# Patient Record
Sex: MALE | Race: BLACK OR AFRICAN AMERICAN | Employment: UNEMPLOYED | URBAN - NONMETROPOLITAN AREA
[De-identification: names, ages, dates, MRNs, and addresses within clinical notes are randomized per-mention and may not be internally consistent; named-entity substitution may affect disease eponyms.]

---

## 2018-03-20 ENCOUNTER — HOSPITAL ENCOUNTER (OUTPATIENT)
Dept: PSYCHIATRY | Age: 48
Setting detail: THERAPIES SERIES
Discharge: HOME | End: 2018-03-20
Payer: MEDICAID

## 2018-03-20 PROCEDURE — 90791 PSYCH DIAGNOSTIC EVALUATION: CPT | Performed by: PHYSICIAN ASSISTANT

## 2018-03-30 ENCOUNTER — HOSPITAL ENCOUNTER (OUTPATIENT)
Dept: PSYCHIATRY | Age: 48
Setting detail: THERAPIES SERIES
End: 2018-03-30
Payer: MEDICAID

## 2018-07-19 ENCOUNTER — APPOINTMENT (OUTPATIENT)
Dept: GENERAL RADIOLOGY | Age: 48
DRG: 056 | End: 2018-07-19
Payer: MEDICAID

## 2018-07-19 ENCOUNTER — HOSPITAL ENCOUNTER (INPATIENT)
Age: 48
LOS: 6 days | Discharge: HOME | DRG: 056 | End: 2018-07-25
Attending: EMERGENCY MEDICINE | Admitting: SURGERY
Payer: MEDICAID

## 2018-07-19 ENCOUNTER — APPOINTMENT (OUTPATIENT)
Dept: CT IMAGING | Age: 48
DRG: 056 | End: 2018-07-19
Payer: MEDICAID

## 2018-07-19 ENCOUNTER — APPOINTMENT (OUTPATIENT)
Dept: MRI IMAGING | Age: 48
DRG: 056 | End: 2018-07-19
Payer: MEDICAID

## 2018-07-19 DIAGNOSIS — R56.9 SEIZURE (HCC): ICD-10-CM

## 2018-07-19 DIAGNOSIS — S02.0XXB OPEN FRACTURE OF PARIETAL BONE, INITIAL ENCOUNTER (HCC): Primary | ICD-10-CM

## 2018-07-19 DIAGNOSIS — S01.01XA LACERATION OF SCALP, INITIAL ENCOUNTER: ICD-10-CM

## 2018-07-19 DIAGNOSIS — E87.20 LACTIC ACID ACIDOSIS: ICD-10-CM

## 2018-07-19 DIAGNOSIS — S02.0XXD: ICD-10-CM

## 2018-07-19 PROBLEM — N17.9 AKI (ACUTE KIDNEY INJURY) (HCC): Status: ACTIVE | Noted: 2018-07-19

## 2018-07-19 PROBLEM — R56.1 SEIZURE AFTER HEAD INJURY (HCC): Status: ACTIVE | Noted: 2018-07-19
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
## 2018-07-19 PROBLEM — F19.90 ILLICIT DRUG USE: Status: ACTIVE | Noted: 2018-07-19

## 2018-07-19 PROBLEM — J96.90 RESPIRATORY FAILURE AFTER TRAUMA (HCC): Status: ACTIVE | Noted: 2018-07-19

## 2018-07-19 PROBLEM — S02.91XB OPEN DEPRESSED FRACTURE OF SKULL (HCC): Status: ACTIVE | Noted: 2018-07-19
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
## 2018-07-19 LAB
ABO: NORMAL
ACETAMINOPHEN LEVEL: < 5 UG/ML (ref 0–20)
ALBUMIN SERPL-MCNC: 4.3 G/DL (ref 3.5–5.1)
ALLEN TEST: ABNORMAL
ALP BLD-CCNC: 90 U/L (ref 38–126)
ALT SERPL-CCNC: 30 U/L (ref 11–66)
AMORPHOUS: ABNORMAL
AMPHETAMINE+METHAMPHETAMINE URINE SCREEN: NEGATIVE
ANGLE TEG: 69.5 DEG (ref 53–72)
ANION GAP SERPL CALCULATED.3IONS-SCNC: 16 MEQ/L (ref 8–16)
ANION GAP SERPL CALCULATED.3IONS-SCNC: 33 MEQ/L (ref 8–16)
ANTIBODY SCREEN: NORMAL
APTT: 26.3 SECONDS (ref 22–38)
AST SERPL-CCNC: 43 U/L (ref 5–40)
BACTERIA: ABNORMAL
BARBITURATE QUANTITATIVE URINE: NEGATIVE
BASE EXCESS (CALCULATED): -7.2 MMOL/L (ref -2.5–2.5)
BASOPHILS # BLD: 0.3 %
BASOPHILS ABSOLUTE: 0 THOU/MM3 (ref 0–0.1)
BENZODIAZEPINE QUANTITATIVE URINE: NEGATIVE
BILIRUB SERPL-MCNC: 0.3 MG/DL (ref 0.3–1.2)
BILIRUBIN URINE: NEGATIVE
BLOOD, URINE: ABNORMAL
BUN BLDV-MCNC: 12 MG/DL (ref 7–22)
BUN BLDV-MCNC: 16 MG/DL (ref 7–22)
CALCIUM SERPL-MCNC: 8.9 MG/DL (ref 8.5–10.5)
CALCIUM SERPL-MCNC: 9.2 MG/DL (ref 8.5–10.5)
CANNABINOID QUANTITATIVE URINE: POSITIVE
CASTS: ABNORMAL /LPF
CASTS: ABNORMAL /LPF
CHARACTER, URINE: ABNORMAL
CHLORIDE BLD-SCNC: 100 MEQ/L (ref 98–111)
CHLORIDE BLD-SCNC: 104 MEQ/L (ref 98–111)
CO2: 21 MEQ/L (ref 23–33)
CO2: 9 MEQ/L (ref 23–33)
COCAINE METABOLITE QUANTITATIVE URINE: POSITIVE
COLLECTED BY:: ABNORMAL
COLOR: YELLOW
COMMENT: ABNORMAL
CREAT SERPL-MCNC: 1 MG/DL (ref 0.4–1.2)
CREAT SERPL-MCNC: 1.3 MG/DL (ref 0.4–1.2)
CRYSTALS: ABNORMAL
DEVICE: ABNORMAL
EKG ATRIAL RATE: 101 BPM
EKG P AXIS: 81 DEGREES
EKG P-R INTERVAL: 150 MS
EKG Q-T INTERVAL: 370 MS
EKG QRS DURATION: 84 MS
EKG QTC CALCULATION (BAZETT): 479 MS
EKG R AXIS: 60 DEGREES
EKG T AXIS: 74 DEGREES
EKG VENTRICULAR RATE: 101 BPM
EOSINOPHIL # BLD: 1.4 %
EOSINOPHILS ABSOLUTE: 0.1 THOU/MM3 (ref 0–0.4)
EPITHELIAL CELLS, UA: ABNORMAL /HPF
EPL-TEG: 1 %
ERYTHROCYTE [DISTWIDTH] IN BLOOD BY AUTOMATED COUNT: 13.5 % (ref 11.5–14.5)
ERYTHROCYTE [DISTWIDTH] IN BLOOD BY AUTOMATED COUNT: 13.6 % (ref 11.5–14.5)
ERYTHROCYTE [DISTWIDTH] IN BLOOD BY AUTOMATED COUNT: 42 FL (ref 35–45)
ERYTHROCYTE [DISTWIDTH] IN BLOOD BY AUTOMATED COUNT: 45.9 FL (ref 35–45)
ETHYL ALCOHOL, SERUM: < 0.01 %
GFR SERPL CREATININE-BSD FRML MDRD: 71 ML/MIN/1.73M2
GFR SERPL CREATININE-BSD FRML MDRD: > 90 ML/MIN/1.73M2
GLUCOSE BLD-MCNC: 111 MG/DL (ref 70–108)
GLUCOSE BLD-MCNC: 93 MG/DL (ref 70–108)
GLUCOSE, URINE: NEGATIVE MG/DL
HCO3: 18 MMOL/L (ref 23–28)
HCT VFR BLD CALC: 42.1 % (ref 42–52)
HCT VFR BLD CALC: 43 % (ref 42–52)
HEMOGLOBIN: 12.9 GM/DL (ref 14–18)
HEMOGLOBIN: 14.3 GM/DL (ref 14–18)
HEPARIN THERAPY: NO
IFIO2: 50
IMMATURE GRANS (ABS): 0.02 THOU/MM3 (ref 0–0.07)
IMMATURE GRANULOCYTES: 0.3 %
INHIBITION AA TEG: 41 %
INHIBITION ADP TEG: 11.4 %
INR BLD: 0.93 (ref 0.85–1.13)
KETONES, URINE: NEGATIVE
KINETICS TEG: 1.4 MINUTES (ref 1–3)
LACTIC ACID: 18.8 MMOL/L (ref 0.5–2.2)
LACTIC ACID: 3.9 MMOL/L (ref 0.5–2.2)
LEUKOCYTE EST, POC: NEGATIVE
LY30 (LYSIS) TEG: 1 % (ref 0–7.5)
LYMPHOCYTES # BLD: 33.2 %
LYMPHOCYTES ABSOLUTE: 2.2 THOU/MM3 (ref 1–4.8)
MA (MAX CLOT) TEG: 63.4 MM (ref 50–70)
MA(AA) TEG: 39.7 MM
MA(ACTIVATED) TEG: 5.6 MM
MA(ADP) TEG: 56.8 MM
MAGNESIUM: 2.3 MG/DL (ref 1.6–2.4)
MCH RBC QN AUTO: 28.3 PG (ref 26–33)
MCH RBC QN AUTO: 28.4 PG (ref 26–33)
MCHC RBC AUTO-ENTMCNC: 30.6 GM/DL (ref 32.2–35.5)
MCHC RBC AUTO-ENTMCNC: 33.3 GM/DL (ref 32.2–35.5)
MCV RBC AUTO: 85.3 FL (ref 80–94)
MCV RBC AUTO: 92.3 FL (ref 80–94)
MISCELLANEOUS LAB TEST RESULT: ABNORMAL
MISCELLANEOUS LAB TEST RESULT: ABNORMAL
MODE: ABNORMAL
MONOCYTES # BLD: 8.8 %
MONOCYTES ABSOLUTE: 0.6 THOU/MM3 (ref 0.4–1.3)
MRSA SCREEN RT-PCR: NEGATIVE
NITRITE, URINE: NEGATIVE
NUCLEATED RED BLOOD CELLS: 0 /100 WBC
O2 SATURATION: 96 %
OPIATES, URINE: NEGATIVE
OSMOLALITY CALCULATION: 285 MOSMOL/KG (ref 275–300)
OSMOLALITY URINE: 354 MOSMOL/KG (ref 250–750)
OSMOLALITY URINE: 604 MOSMOL/KG (ref 250–750)
OSMOLALITY: 298 MOSMOL/KG (ref 275–295)
OXYCODONE: NEGATIVE
PCO2: 33 MMHG (ref 35–45)
PH BLOOD GAS: 7.34 (ref 7.35–7.45)
PH UA: 5
PHENCYCLIDINE QUANTITATIVE URINE: NEGATIVE
PLATELET # BLD: 174 THOU/MM3 (ref 130–400)
PLATELET # BLD: 178 THOU/MM3 (ref 130–400)
PMV BLD AUTO: 11 FL (ref 9.4–12.4)
PMV BLD AUTO: 11 FL (ref 9.4–12.4)
PO2: 84 MMHG (ref 71–104)
POTASSIUM REFLEX MAGNESIUM: 3.4 MEQ/L (ref 3.5–5.2)
POTASSIUM SERPL-SCNC: 3.9 MEQ/L (ref 3.5–5.2)
PROTEIN UA: 100 MG/DL
RBC # BLD: 4.56 MILL/MM3 (ref 4.7–6.1)
RBC # BLD: 5.04 MILL/MM3 (ref 4.7–6.1)
RBC URINE: ABNORMAL /HPF
REACTION TIME TEG: 4.8 MINUTES (ref 5–10)
RENAL EPITHELIAL, UA: ABNORMAL
RH FACTOR: NORMAL
SALICYLATE, SERUM: < 0.3 MG/DL (ref 2–10)
SEG NEUTROPHILS: 56 %
SEGMENTED NEUTROPHILS ABSOLUTE COUNT: 3.6 THOU/MM3 (ref 1.8–7.7)
SET PEEP: 6 MMHG
SET RESPIRATORY RATE: 14 BPM
SODIUM BLD-SCNC: 141 MEQ/L (ref 135–145)
SODIUM BLD-SCNC: 142 MEQ/L (ref 135–145)
SOURCE, BLOOD GAS: ABNORMAL
SPECIFIC GRAVITY UA: 1.02 (ref 1–1.03)
TOTAL PROTEIN: 7 G/DL (ref 6.1–8)
UROBILINOGEN, URINE: 0.2 EU/DL
WBC # BLD: 6.5 THOU/MM3 (ref 4.8–10.8)
WBC # BLD: 8.8 THOU/MM3 (ref 4.8–10.8)
WBC UA: ABNORMAL /HPF
YEAST: ABNORMAL

## 2018-07-19 PROCEDURE — 74177 CT ABD & PELVIS W/CONTRAST: CPT

## 2018-07-19 PROCEDURE — 2580000003 HC RX 258: Performed by: INTERNAL MEDICINE

## 2018-07-19 PROCEDURE — 6360000002 HC RX W HCPCS

## 2018-07-19 PROCEDURE — 6360000002 HC RX W HCPCS: Performed by: NURSE PRACTITIONER

## 2018-07-19 PROCEDURE — 93005 ELECTROCARDIOGRAM TRACING: CPT | Performed by: EMERGENCY MEDICINE

## 2018-07-19 PROCEDURE — 0BH17EZ INSERTION OF ENDOTRACHEAL AIRWAY INTO TRACHEA, VIA NATURAL OR ARTIFICIAL OPENING: ICD-10-PCS | Performed by: EMERGENCY MEDICINE

## 2018-07-19 PROCEDURE — 99285 EMERGENCY DEPT VISIT HI MDM: CPT

## 2018-07-19 PROCEDURE — 2700000000 HC OXYGEN THERAPY PER DAY

## 2018-07-19 PROCEDURE — 80053 COMPREHEN METABOLIC PANEL: CPT

## 2018-07-19 PROCEDURE — 2709999900 HC NON-CHARGEABLE SUPPLY

## 2018-07-19 PROCEDURE — 87205 SMEAR GRAM STAIN: CPT

## 2018-07-19 PROCEDURE — APPNB60 APP NON BILLABLE TIME 46-60 MINS: Performed by: PHYSICIAN ASSISTANT

## 2018-07-19 PROCEDURE — 83605 ASSAY OF LACTIC ACID: CPT

## 2018-07-19 PROCEDURE — 86850 RBC ANTIBODY SCREEN: CPT

## 2018-07-19 PROCEDURE — 99222 1ST HOSP IP/OBS MODERATE 55: CPT | Performed by: NEUROLOGICAL SURGERY

## 2018-07-19 PROCEDURE — 71045 X-RAY EXAM CHEST 1 VIEW: CPT

## 2018-07-19 PROCEDURE — 76376 3D RENDER W/INTRP POSTPROCES: CPT

## 2018-07-19 PROCEDURE — 5A1935Z RESPIRATORY VENTILATION, LESS THAN 24 CONSECUTIVE HOURS: ICD-10-PCS | Performed by: EMERGENCY MEDICINE

## 2018-07-19 PROCEDURE — 31500 INSERT EMERGENCY AIRWAY: CPT

## 2018-07-19 PROCEDURE — 87641 MR-STAPH DNA AMP PROBE: CPT

## 2018-07-19 PROCEDURE — 72125 CT NECK SPINE W/O DYE: CPT

## 2018-07-19 PROCEDURE — 85576 BLOOD PLATELET AGGREGATION: CPT

## 2018-07-19 PROCEDURE — 2500000003 HC RX 250 WO HCPCS: Performed by: NURSE PRACTITIONER

## 2018-07-19 PROCEDURE — 87081 CULTURE SCREEN ONLY: CPT

## 2018-07-19 PROCEDURE — 70450 CT HEAD/BRAIN W/O DYE: CPT

## 2018-07-19 PROCEDURE — 85610 PROTHROMBIN TIME: CPT

## 2018-07-19 PROCEDURE — 71260 CT THORAX DX C+: CPT

## 2018-07-19 PROCEDURE — 96376 TX/PRO/DX INJ SAME DRUG ADON: CPT

## 2018-07-19 PROCEDURE — 90471 IMMUNIZATION ADMIN: CPT | Performed by: NURSE PRACTITIONER

## 2018-07-19 PROCEDURE — 82803 BLOOD GASES ANY COMBINATION: CPT

## 2018-07-19 PROCEDURE — 93010 ELECTROCARDIOGRAM REPORT: CPT | Performed by: INTERNAL MEDICINE

## 2018-07-19 PROCEDURE — 6360000002 HC RX W HCPCS: Performed by: EMERGENCY MEDICINE

## 2018-07-19 PROCEDURE — 2500000003 HC RX 250 WO HCPCS: Performed by: EMERGENCY MEDICINE

## 2018-07-19 PROCEDURE — 83735 ASSAY OF MAGNESIUM: CPT

## 2018-07-19 PROCEDURE — 2000000000 HC ICU R&B

## 2018-07-19 PROCEDURE — 0HQ0XZZ REPAIR SCALP SKIN, EXTERNAL APPROACH: ICD-10-PCS | Performed by: EMERGENCY MEDICINE

## 2018-07-19 PROCEDURE — 6360000004 HC RX CONTRAST MEDICATION: Performed by: EMERGENCY MEDICINE

## 2018-07-19 PROCEDURE — 2500000003 HC RX 250 WO HCPCS: Performed by: NEUROLOGICAL SURGERY

## 2018-07-19 PROCEDURE — 2580000003 HC RX 258: Performed by: NURSE PRACTITIONER

## 2018-07-19 PROCEDURE — 87070 CULTURE OTHR SPECIMN AEROBIC: CPT

## 2018-07-19 PROCEDURE — 6370000000 HC RX 637 (ALT 250 FOR IP): Performed by: NURSE PRACTITIONER

## 2018-07-19 PROCEDURE — 86900 BLOOD TYPING SEROLOGIC ABO: CPT

## 2018-07-19 PROCEDURE — 87086 URINE CULTURE/COLONY COUNT: CPT

## 2018-07-19 PROCEDURE — 2580000003 HC RX 258: Performed by: NEUROLOGICAL SURGERY

## 2018-07-19 PROCEDURE — 36600 WITHDRAWAL OF ARTERIAL BLOOD: CPT

## 2018-07-19 PROCEDURE — 85025 COMPLETE CBC W/AUTO DIFF WBC: CPT

## 2018-07-19 PROCEDURE — 90715 TDAP VACCINE 7 YRS/> IM: CPT | Performed by: NURSE PRACTITIONER

## 2018-07-19 PROCEDURE — 83930 ASSAY OF BLOOD OSMOLALITY: CPT

## 2018-07-19 PROCEDURE — 86901 BLOOD TYPING SEROLOGIC RH(D): CPT

## 2018-07-19 PROCEDURE — 6820000003 HC L2 TRAUMA ALERT ACTIVATION

## 2018-07-19 PROCEDURE — 96375 TX/PRO/DX INJ NEW DRUG ADDON: CPT

## 2018-07-19 PROCEDURE — 99292 CRITICAL CARE ADDL 30 MIN: CPT | Performed by: SURGERY

## 2018-07-19 PROCEDURE — 85730 THROMBOPLASTIN TIME PARTIAL: CPT

## 2018-07-19 PROCEDURE — APPSS180 APP SPLIT SHARED TIME > 60 MINUTES: Performed by: NURSE PRACTITIONER

## 2018-07-19 PROCEDURE — 96366 THER/PROPH/DIAG IV INF ADDON: CPT

## 2018-07-19 PROCEDURE — 94002 VENT MGMT INPAT INIT DAY: CPT

## 2018-07-19 PROCEDURE — 70551 MRI BRAIN STEM W/O DYE: CPT

## 2018-07-19 PROCEDURE — 2500000003 HC RX 250 WO HCPCS: Performed by: INTERNAL MEDICINE

## 2018-07-19 PROCEDURE — G0480 DRUG TEST DEF 1-7 CLASSES: HCPCS

## 2018-07-19 PROCEDURE — 2580000003 HC RX 258: Performed by: EMERGENCY MEDICINE

## 2018-07-19 PROCEDURE — 51701 INSERT BLADDER CATHETER: CPT

## 2018-07-19 PROCEDURE — 83935 ASSAY OF URINE OSMOLALITY: CPT

## 2018-07-19 PROCEDURE — 80048 BASIC METABOLIC PNL TOTAL CA: CPT

## 2018-07-19 PROCEDURE — 96365 THER/PROPH/DIAG IV INF INIT: CPT

## 2018-07-19 PROCEDURE — 99291 CRITICAL CARE FIRST HOUR: CPT | Performed by: SURGERY

## 2018-07-19 PROCEDURE — 6360000002 HC RX W HCPCS: Performed by: NEUROLOGICAL SURGERY

## 2018-07-19 PROCEDURE — 80307 DRUG TEST PRSMV CHEM ANLYZR: CPT

## 2018-07-19 PROCEDURE — 36415 COLL VENOUS BLD VENIPUNCTURE: CPT

## 2018-07-19 PROCEDURE — 12002 RPR S/N/AX/GEN/TRNK2.6-7.5CM: CPT

## 2018-07-19 PROCEDURE — 81001 URINALYSIS AUTO W/SCOPE: CPT

## 2018-07-19 PROCEDURE — 85027 COMPLETE CBC AUTOMATED: CPT

## 2018-07-19 PROCEDURE — C9113 INJ PANTOPRAZOLE SODIUM, VIA: HCPCS | Performed by: NURSE PRACTITIONER

## 2018-07-19 RX ORDER — ETOMIDATE 2 MG/ML
INJECTION INTRAVENOUS DAILY PRN
Status: COMPLETED | OUTPATIENT
Start: 2018-07-19 | End: 2018-07-19

## 2018-07-19 RX ORDER — LORAZEPAM 2 MG/ML
INJECTION INTRAMUSCULAR DAILY PRN
Status: COMPLETED | OUTPATIENT
Start: 2018-07-19 | End: 2018-07-19

## 2018-07-19 RX ORDER — MIDAZOLAM HYDROCHLORIDE 1 MG/ML
INJECTION INTRAMUSCULAR; INTRAVENOUS DAILY PRN
Status: COMPLETED | OUTPATIENT
Start: 2018-07-19 | End: 2018-07-19

## 2018-07-19 RX ORDER — LORAZEPAM 2 MG/ML
1 INJECTION INTRAMUSCULAR EVERY 4 HOURS PRN
Status: DISCONTINUED | OUTPATIENT
Start: 2018-07-19 | End: 2018-07-25 | Stop reason: HOSPADM

## 2018-07-19 RX ORDER — FENTANYL CITRATE 50 UG/ML
INJECTION, SOLUTION INTRAMUSCULAR; INTRAVENOUS
Status: DISPENSED
Start: 2018-07-19 | End: 2018-07-19

## 2018-07-19 RX ORDER — PANTOPRAZOLE SODIUM 40 MG/10ML
40 INJECTION, POWDER, LYOPHILIZED, FOR SOLUTION INTRAVENOUS DAILY
Status: DISCONTINUED | OUTPATIENT
Start: 2018-07-19 | End: 2018-07-21 | Stop reason: SDUPTHER

## 2018-07-19 RX ORDER — FENTANYL CITRATE 50 UG/ML
25 INJECTION, SOLUTION INTRAMUSCULAR; INTRAVENOUS ONCE
Status: COMPLETED | OUTPATIENT
Start: 2018-07-19 | End: 2018-07-19

## 2018-07-19 RX ORDER — MANNITOL 20 G/100ML
60 INJECTION, SOLUTION INTRAVENOUS ONCE
Status: DISCONTINUED | OUTPATIENT
Start: 2018-07-19 | End: 2018-07-19

## 2018-07-19 RX ORDER — HYDROCODONE BITARTRATE AND ACETAMINOPHEN 5; 325 MG/1; MG/1
1 TABLET ORAL EVERY 6 HOURS PRN
Status: DISCONTINUED | OUTPATIENT
Start: 2018-07-19 | End: 2018-07-25 | Stop reason: HOSPADM

## 2018-07-19 RX ORDER — ONDANSETRON 2 MG/ML
4 INJECTION INTRAMUSCULAR; INTRAVENOUS EVERY 6 HOURS PRN
Status: DISCONTINUED | OUTPATIENT
Start: 2018-07-19 | End: 2018-07-25 | Stop reason: ALTCHOICE

## 2018-07-19 RX ORDER — VECURONIUM BROMIDE 1 MG/ML
INJECTION, POWDER, LYOPHILIZED, FOR SOLUTION INTRAVENOUS
Status: DISCONTINUED
Start: 2018-07-19 | End: 2018-07-19

## 2018-07-19 RX ORDER — VECURONIUM BROMIDE 1 MG/ML
INJECTION, POWDER, LYOPHILIZED, FOR SOLUTION INTRAVENOUS DAILY PRN
Status: COMPLETED | OUTPATIENT
Start: 2018-07-19 | End: 2018-07-19

## 2018-07-19 RX ORDER — SODIUM CHLORIDE 0.9 % (FLUSH) 0.9 %
10 SYRINGE (ML) INJECTION EVERY 12 HOURS SCHEDULED
Status: DISCONTINUED | OUTPATIENT
Start: 2018-07-19 | End: 2018-07-22

## 2018-07-19 RX ORDER — GINSENG 100 MG
CAPSULE ORAL 3 TIMES DAILY
Status: DISCONTINUED | OUTPATIENT
Start: 2018-07-19 | End: 2018-07-25 | Stop reason: HOSPADM

## 2018-07-19 RX ORDER — PROPOFOL 10 MG/ML
INJECTION, EMULSION INTRAVENOUS
Status: DISCONTINUED
Start: 2018-07-19 | End: 2018-07-19

## 2018-07-19 RX ORDER — DOCUSATE SODIUM 100 MG/1
100 CAPSULE, LIQUID FILLED ORAL 2 TIMES DAILY
Status: DISCONTINUED | OUTPATIENT
Start: 2018-07-19 | End: 2018-07-25 | Stop reason: HOSPADM

## 2018-07-19 RX ORDER — SODIUM CHLORIDE 9 MG/ML
INJECTION, SOLUTION INTRAVENOUS CONTINUOUS
Status: DISCONTINUED | OUTPATIENT
Start: 2018-07-19 | End: 2018-07-22

## 2018-07-19 RX ORDER — ACETAMINOPHEN 325 MG/1
650 TABLET ORAL EVERY 4 HOURS PRN
Status: DISCONTINUED | OUTPATIENT
Start: 2018-07-19 | End: 2018-07-25 | Stop reason: HOSPADM

## 2018-07-19 RX ORDER — MANNITOL 20 G/100ML
INJECTION, SOLUTION INTRAVENOUS
Status: DISCONTINUED
Start: 2018-07-19 | End: 2018-07-19

## 2018-07-19 RX ORDER — POTASSIUM CHLORIDE 7.45 MG/ML
10 INJECTION INTRAVENOUS
Status: COMPLETED | OUTPATIENT
Start: 2018-07-19 | End: 2018-07-19

## 2018-07-19 RX ORDER — MANNITOL 20 G/100ML
50 INJECTION, SOLUTION INTRAVENOUS ONCE
Status: COMPLETED | OUTPATIENT
Start: 2018-07-19 | End: 2018-07-19

## 2018-07-19 RX ORDER — PROPOFOL 10 MG/ML
10 INJECTION, EMULSION INTRAVENOUS
Status: DISCONTINUED | OUTPATIENT
Start: 2018-07-19 | End: 2018-07-19

## 2018-07-19 RX ORDER — GLYCOPYRROLATE 0.2 MG/ML
INJECTION INTRAMUSCULAR; INTRAVENOUS DAILY PRN
Status: COMPLETED | OUTPATIENT
Start: 2018-07-19 | End: 2018-07-19

## 2018-07-19 RX ORDER — BISACODYL 10 MG
10 SUPPOSITORY, RECTAL RECTAL DAILY PRN
Status: DISCONTINUED | OUTPATIENT
Start: 2018-07-19 | End: 2018-07-25 | Stop reason: HOSPADM

## 2018-07-19 RX ORDER — SODIUM CHLORIDE 0.9 % (FLUSH) 0.9 %
10 SYRINGE (ML) INJECTION PRN
Status: DISCONTINUED | OUTPATIENT
Start: 2018-07-19 | End: 2018-07-25 | Stop reason: HOSPADM

## 2018-07-19 RX ADMIN — BACITRACIN: 500 OINTMENT TOPICAL at 08:32

## 2018-07-19 RX ADMIN — Medication 10 ML: at 20:25

## 2018-07-19 RX ADMIN — FENTANYL CITRATE 25 MCG: 50 INJECTION INTRAMUSCULAR; INTRAVENOUS at 04:21

## 2018-07-19 RX ADMIN — PROPOFOL 30 MCG/KG/MIN: 10 INJECTION, EMULSION INTRAVENOUS at 01:32

## 2018-07-19 RX ADMIN — BACITRACIN: 500 OINTMENT TOPICAL at 20:24

## 2018-07-19 RX ADMIN — CEFAZOLIN 2 G: 1 INJECTION, POWDER, FOR SOLUTION INTRAMUSCULAR; INTRAVENOUS; PARENTERAL at 02:24

## 2018-07-19 RX ADMIN — SODIUM CHLORIDE: 9 INJECTION, SOLUTION INTRAVENOUS at 08:40

## 2018-07-19 RX ADMIN — FENTANYL CITRATE 25 MCG/HR: 50 INJECTION, SOLUTION INTRAMUSCULAR; INTRAVENOUS at 04:23

## 2018-07-19 RX ADMIN — VECURONIUM BROMIDE 10 MG: 10 INJECTION, POWDER, LYOPHILIZED, FOR SOLUTION INTRAVENOUS at 01:41

## 2018-07-19 RX ADMIN — IOPAMIDOL 80 ML: 755 INJECTION, SOLUTION INTRAVENOUS at 00:56

## 2018-07-19 RX ADMIN — VECURONIUM BROMIDE 10 MG: 10 INJECTION, POWDER, LYOPHILIZED, FOR SOLUTION INTRAVENOUS at 00:20

## 2018-07-19 RX ADMIN — MANNITOL 50 G: 20 INJECTION, SOLUTION INTRAVENOUS at 05:55

## 2018-07-19 RX ADMIN — POTASSIUM CHLORIDE 10 MEQ: 7.46 INJECTION, SOLUTION INTRAVENOUS at 08:32

## 2018-07-19 RX ADMIN — ACETAMINOPHEN 650 MG: 325 TABLET ORAL at 20:24

## 2018-07-19 RX ADMIN — POTASSIUM CHLORIDE 10 MEQ: 7.46 INJECTION, SOLUTION INTRAVENOUS at 10:05

## 2018-07-19 RX ADMIN — SODIUM CHLORIDE: 9 INJECTION, SOLUTION INTRAVENOUS at 04:04

## 2018-07-19 RX ADMIN — GLYCOPYRROLATE 0.2 MG: 0.2 INJECTION, SOLUTION INTRAMUSCULAR; INTRAVENOUS at 00:45

## 2018-07-19 RX ADMIN — DEXMEDETOMIDINE HYDROCHLORIDE 0.2 MCG/KG/HR: 100 INJECTION, SOLUTION INTRAVENOUS at 16:15

## 2018-07-19 RX ADMIN — FENTANYL CITRATE 75 MCG/HR: 50 INJECTION, SOLUTION INTRAMUSCULAR; INTRAVENOUS at 10:52

## 2018-07-19 RX ADMIN — DEXTROSE MONOHYDRATE 5 MG/HR: 50 INJECTION, SOLUTION INTRAVENOUS at 10:44

## 2018-07-19 RX ADMIN — MIDAZOLAM HYDROCHLORIDE 2 MG: 1 INJECTION, SOLUTION INTRAMUSCULAR; INTRAVENOUS at 01:39

## 2018-07-19 RX ADMIN — POTASSIUM CHLORIDE 10 MEQ: 7.46 INJECTION, SOLUTION INTRAVENOUS at 13:21

## 2018-07-19 RX ADMIN — LEVETIRACETAM 500 MG: 100 INJECTION, SOLUTION INTRAVENOUS at 20:40

## 2018-07-19 RX ADMIN — LORAZEPAM 2 MG: 2 INJECTION, SOLUTION INTRAMUSCULAR; INTRAVENOUS at 00:12

## 2018-07-19 RX ADMIN — PROPOFOL 50 MCG/KG/MIN: 10 INJECTION, EMULSION INTRAVENOUS at 13:17

## 2018-07-19 RX ADMIN — LEVETIRACETAM 1000 MG: 100 INJECTION, SOLUTION INTRAVENOUS at 03:01

## 2018-07-19 RX ADMIN — DEXTROSE MONOHYDRATE 5 MG/HR: 50 INJECTION, SOLUTION INTRAVENOUS at 06:06

## 2018-07-19 RX ADMIN — DOCUSATE SODIUM 100 MG: 100 CAPSULE, LIQUID FILLED ORAL at 20:24

## 2018-07-19 RX ADMIN — POTASSIUM CHLORIDE 10 MEQ: 7.46 INJECTION, SOLUTION INTRAVENOUS at 14:50

## 2018-07-19 RX ADMIN — Medication 10 ML: at 08:38

## 2018-07-19 RX ADMIN — ETOMIDATE 20 MG: 2 INJECTION INTRAVENOUS at 00:20

## 2018-07-19 RX ADMIN — PROPOFOL 10 MCG/KG/MIN: 10 INJECTION, EMULSION INTRAVENOUS at 00:33

## 2018-07-19 RX ADMIN — TETANUS TOXOID, REDUCED DIPHTHERIA TOXOID AND ACELLULAR PERTUSSIS VACCINE, ADSORBED 0.5 ML: 5; 2.5; 8; 8; 2.5 SUSPENSION INTRAMUSCULAR at 02:02

## 2018-07-19 RX ADMIN — BACITRACIN: 500 OINTMENT TOPICAL at 14:16

## 2018-07-19 RX ADMIN — PANTOPRAZOLE SODIUM 40 MG: 40 INJECTION, POWDER, FOR SOLUTION INTRAVENOUS at 08:32

## 2018-07-19 ASSESSMENT — PULMONARY FUNCTION TESTS
PIF_VALUE: 23
PIF_VALUE: 25
PIF_VALUE: 25
PIF_VALUE: 21

## 2018-07-19 ASSESSMENT — PAIN SCALES - GENERAL
PAINLEVEL_OUTOF10: 4
PAINLEVEL_OUTOF10: 3

## 2018-07-19 ASSESSMENT — PAIN DESCRIPTION - LOCATION: LOCATION: GENERALIZED
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
## 2018-07-19 NOTE — PROGRESS NOTES
time    Nutrition Evaluation:   · Evaluation: Goals set   · Goals: adequate nutrient intake in 1-4 days    · Monitoring: NPO Status, Skin Integrity, Wound Healing, Weight, Pertinent Labs (nutrition support needs)    See Adult Nutrition Doc Flowsheet for more detail.      Electronically signed by Karsten Khoury RD, LD on 7/19/18 at 11:51 AM    Contact Number: [phone]
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
## 2018-07-19 NOTE — PROGRESS NOTES
12 Dr Sunita Driscoll RN, Tk Bernabe RN and myself at bedside. Pt sedation paused. Within a couple of minutes, Pt became aroused. Moving all extremities and attempting to sit up and pull at ETT tube. Sedation restarted. 56 Dr Gayatri Bradley at bedside, updated. Dr Gayatri Bradley ordered a MRI of brain without contrast. Will decide on time for surgery after review of MRI.    1200 Pt off floor to MRI with Marilia Gaona RN, and Kavin RT    1315 Pt back from MRI. [address] [facility] 87 read MRI, orders to extubate and get a EEG. 1745 Pt extubated by RT. 96% on RA. Fentanyl. Propofol, and precedex drips were weaned and stopped. 1800 C Spine cleared and removed by Dr Rosie Kerns.

## 2018-07-19 NOTE — PROGRESS NOTES
VENTILATOR LIBERATION PROTOCOL    PRE-TRIAL PATIENT ASSESSMENT - COMPLETED AT 1717    Ventilatory Assessment:    PARAMETER CRITERIA FOR WEANING   Reversal  of the acute disease process that prompted intubation: Yes At least partial or complete reversal   FiO2 : 30 % FIO2 less than or equal to 50%     PEEP less than or equal to 8 cm H2O   Hemodynamic stability: Yes Dopamine or Dobutamine at 5 mcg/kg/minute or less   Adequate correction of electrolytes, Hgb, and HCT: Yes Within lab range   Neurologic stability: Yes Ability to cough, voluntarily initiate ventilator effort, cooperate with pulmonary toilet measures     ABG:   Lab Results   Component Value Date    PH 7.34 07/19/2018    PO2 84 07/19/2018    PCO2 33 07/19/2018    HCO3 18 07/19/2018    O2SAT 96 07/19/2018     HGB/WBC:  Lab Results   Component Value Date    HGB 14.3 07/19/2018    WBC 8.8 07/19/2018         Vital Signs:    PARAMETER CRITERIA FOR WEANING Meets Criteria   Pulse: 71 Within patient's normal limits / stable Yes   Resp: 14 Less than 35 Yes   BP: 120/77 Within patient's normal limits / minimal pressors (Hemodynamically Stable) Yes   SpO2: 100 % Greater than or equal to 90% Yes   Temp: 97.9 °F (36.6 °C) Less than 38. 5oC / 101. 3oF Yes    Sedation/paralytic weaned Yes     [x]    Based on this assessment and the Ventilator Liberation Protocol, this patient IS being placed on a Spontaneous Breathing Trial (SBT) at this time.  []    Based on this assessment and the Ventilator Liberation Protocol, this patient IS NOT being placed on a Spontaneous Breathing Trial (SBT) at this time.     SBT - Initiated at  1711    Ventilator Settings:  CPAP 5 cmH2O, Pressure Support 10 cmH2O  Tube Resistance Compensation (TRC) on    1 Minute SBT Respiratory Parameters:   VE: 3.6 L   RR: 11 b/m   VT: 511 mL (average VT = VE/RR)   RSBI: 46 (RR/VT in liters)   SPO2: 100 %    [x]   RR is less than 35 but greater than 9 per minute, RSBI is less than 106, SpO2 greater than 91%,
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
## 2018-07-19 NOTE — PLAN OF CARE
Problem: Nutrition  Goal: Optimal nutrition therapy  Outcome: Ongoing  Nutrition Problem: Inadequate oral intake  Intervention: Food and/or Nutrient Delivery: Continue NPO (if unable to extubate recommend starting Pivot with goal of 45ml/hour)  Nutritional Goals: adequate nutrient intake in 1-4 days

## 2018-07-19 NOTE — ED NOTES
Report called to [address] [facility],[address] Floor on ICU     Darshana Moss RN  07/19/18 2846
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
## 2018-07-19 NOTE — ED NOTES
Pt resting comfortably on cot. Pt tolerating ETT. Will continue to monitor.      Rick Joyce RN  07/19/18 4032
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
## 2018-07-19 NOTE — PLAN OF CARE
Problem: Impaired respiratory status  Goal: Provide mechanical and oxygen support to facilitate gas exchange  Outcome: Completed Date Met: 07/19/18  Pt successfully extubated
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
## 2018-07-20 PROCEDURE — 6370000000 HC RX 637 (ALT 250 FOR IP): Performed by: NURSE PRACTITIONER

## 2018-07-20 PROCEDURE — 97165 OT EVAL LOW COMPLEX 30 MIN: CPT

## 2018-07-20 PROCEDURE — 97530 THERAPEUTIC ACTIVITIES: CPT

## 2018-07-20 PROCEDURE — 95819 EEG AWAKE AND ASLEEP: CPT

## 2018-07-20 PROCEDURE — G8988 SELF CARE GOAL STATUS: HCPCS

## 2018-07-20 PROCEDURE — C9113 INJ PANTOPRAZOLE SODIUM, VIA: HCPCS | Performed by: NURSE PRACTITIONER

## 2018-07-20 PROCEDURE — APPSS60 APP SPLIT SHARED TIME 46-60 MINUTES: Performed by: NURSE PRACTITIONER

## 2018-07-20 PROCEDURE — 2580000003 HC RX 258: Performed by: NURSE PRACTITIONER

## 2018-07-20 PROCEDURE — 6360000002 HC RX W HCPCS: Performed by: NEUROLOGICAL SURGERY

## 2018-07-20 PROCEDURE — 99232 SBSQ HOSP IP/OBS MODERATE 35: CPT | Performed by: NEUROLOGICAL SURGERY

## 2018-07-20 PROCEDURE — 2580000003 HC RX 258: Performed by: NEUROLOGICAL SURGERY

## 2018-07-20 PROCEDURE — 1210000002 HC MED SURG R&B - NEUROSCIENCE

## 2018-07-20 PROCEDURE — 92523 SPEECH SOUND LANG COMPREHEN: CPT

## 2018-07-20 PROCEDURE — G8979 MOBILITY GOAL STATUS: HCPCS

## 2018-07-20 PROCEDURE — 6360000002 HC RX W HCPCS: Performed by: NURSE PRACTITIONER

## 2018-07-20 PROCEDURE — 97161 PT EVAL LOW COMPLEX 20 MIN: CPT

## 2018-07-20 PROCEDURE — G8978 MOBILITY CURRENT STATUS: HCPCS

## 2018-07-20 PROCEDURE — 99233 SBSQ HOSP IP/OBS HIGH 50: CPT | Performed by: SURGERY

## 2018-07-20 PROCEDURE — G8987 SELF CARE CURRENT STATUS: HCPCS

## 2018-07-20 RX ORDER — PANTOPRAZOLE SODIUM 40 MG/1
40 TABLET, DELAYED RELEASE ORAL
Status: CANCELLED | OUTPATIENT
Start: 2018-07-21

## 2018-07-20 RX ADMIN — DOCUSATE SODIUM 100 MG: 100 CAPSULE, LIQUID FILLED ORAL at 10:50

## 2018-07-20 RX ADMIN — PANTOPRAZOLE SODIUM 40 MG: 40 INJECTION, POWDER, FOR SOLUTION INTRAVENOUS at 10:50

## 2018-07-20 RX ADMIN — BACITRACIN: 500 OINTMENT TOPICAL at 10:50

## 2018-07-20 RX ADMIN — BACITRACIN: 500 OINTMENT TOPICAL at 15:50

## 2018-07-20 RX ADMIN — Medication 10 ML: at 22:11

## 2018-07-20 RX ADMIN — LEVETIRACETAM 500 MG: 100 INJECTION, SOLUTION INTRAVENOUS at 10:57

## 2018-07-20 RX ADMIN — Medication 10 ML: at 10:50

## 2018-07-20 RX ADMIN — LEVETIRACETAM 500 MG: 100 INJECTION, SOLUTION INTRAVENOUS at 22:11

## 2018-07-20 RX ADMIN — BACITRACIN: 500 OINTMENT TOPICAL at 22:31

## 2018-07-20 RX ADMIN — Medication 3.3 MG: at 22:25

## 2018-07-20 ASSESSMENT — PAIN SCALES - GENERAL
PAINLEVEL_OUTOF10: 4
PAINLEVEL_OUTOF10: 4
PAINLEVEL_OUTOF10: 0
PAINLEVEL_OUTOF10: 4

## 2018-07-20 ASSESSMENT — PAIN DESCRIPTION - ORIENTATION
ORIENTATION: OUTER
ORIENTATION: OUTER

## 2018-07-20 ASSESSMENT — 9 HOLE PEG TEST
TEST_RESULT: FUNCTIONAL
TEST_RESULT: FUNCTIONAL
TESTTIME_SECONDS: 33
TESTTIME_SECONDS: 33

## 2018-07-20 ASSESSMENT — PAIN DESCRIPTION - LOCATION
LOCATION: HEAD;GENERALIZED
LOCATION: GENERALIZED
LOCATION: HEAD
LOCATION: GENERALIZED

## 2018-07-20 ASSESSMENT — PAIN DESCRIPTION - FREQUENCY
FREQUENCY: CONTINUOUS
FREQUENCY: CONTINUOUS

## 2018-07-20 ASSESSMENT — PAIN DESCRIPTION - DESCRIPTORS
DESCRIPTORS: ACHING;DISCOMFORT
DESCRIPTORS: ACHING

## 2018-07-20 ASSESSMENT — PAIN DESCRIPTION - PAIN TYPE
TYPE: ACUTE PAIN

## 2018-07-20 ASSESSMENT — PAIN SCALES - WONG BAKER: WONGBAKER_NUMERICALRESPONSE: 4

## 2018-07-20 ASSESSMENT — PAIN DESCRIPTION - PROGRESSION: CLINICAL_PROGRESSION: NOT CHANGED
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
## 2018-07-20 NOTE — PROGRESS NOTES
EEG tech asked pt if he would allow her to do this test. Pt said what good would it do. Aj explained the EEG and what results it would provide. Pt said he might consider test if he could get some food first.  Tech relayed this message to RN and asked if floor could call me if pt would allow EEG to be done.

## 2018-07-20 NOTE — PROGRESS NOTES
to improve function in home and work settings.       LONG TERM GOALS:  No LTG due to short ELOS    Carmen Gomez M.S. [id number]

## 2018-07-20 NOTE — CARE COORDINATION
7/20/18, 3:33 PM    Anticipated weekend discharge. Home alone. Denies needs. Discharge plan discussed by  and . Discharge plan reviewed with patient/ family. Patient/ family verbalize understanding of discharge plan and are in agreement with plan. Understanding was demonstrated using the teach back method.
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
## 2018-07-21 LAB
ANION GAP SERPL CALCULATED.3IONS-SCNC: 11 MEQ/L (ref 8–16)
BUN BLDV-MCNC: 8 MG/DL (ref 7–22)
CALCIUM SERPL-MCNC: 8.9 MG/DL (ref 8.5–10.5)
CHLORIDE BLD-SCNC: 104 MEQ/L (ref 98–111)
CO2: 27 MEQ/L (ref 23–33)
CREAT SERPL-MCNC: 0.9 MG/DL (ref 0.4–1.2)
GFR SERPL CREATININE-BSD FRML MDRD: > 90 ML/MIN/1.73M2
GLUCOSE BLD-MCNC: 94 MG/DL (ref 70–108)
GRAM STAIN RESULT: NORMAL
POTASSIUM SERPL-SCNC: 4.1 MEQ/L (ref 3.5–5.2)
RESPIRATORY CULTURE: NORMAL
SODIUM BLD-SCNC: 142 MEQ/L (ref 135–145)
URINE CULTURE, ROUTINE: NORMAL

## 2018-07-21 PROCEDURE — 97116 GAIT TRAINING THERAPY: CPT

## 2018-07-21 PROCEDURE — 80048 BASIC METABOLIC PNL TOTAL CA: CPT

## 2018-07-21 PROCEDURE — 2709999900 HC NON-CHARGEABLE SUPPLY

## 2018-07-21 PROCEDURE — C9113 INJ PANTOPRAZOLE SODIUM, VIA: HCPCS | Performed by: NURSE PRACTITIONER

## 2018-07-21 PROCEDURE — 99232 SBSQ HOSP IP/OBS MODERATE 35: CPT | Performed by: SURGERY

## 2018-07-21 PROCEDURE — 2580000003 HC RX 258: Performed by: NEUROLOGICAL SURGERY

## 2018-07-21 PROCEDURE — 36415 COLL VENOUS BLD VENIPUNCTURE: CPT

## 2018-07-21 PROCEDURE — 6370000000 HC RX 637 (ALT 250 FOR IP): Performed by: INTERNAL MEDICINE

## 2018-07-21 PROCEDURE — 6360000002 HC RX W HCPCS: Performed by: NURSE PRACTITIONER

## 2018-07-21 PROCEDURE — 97110 THERAPEUTIC EXERCISES: CPT

## 2018-07-21 PROCEDURE — 2580000003 HC RX 258: Performed by: NURSE PRACTITIONER

## 2018-07-21 PROCEDURE — 6370000000 HC RX 637 (ALT 250 FOR IP): Performed by: NURSE PRACTITIONER

## 2018-07-21 PROCEDURE — 99231 SBSQ HOSP IP/OBS SF/LOW 25: CPT | Performed by: NEUROLOGICAL SURGERY

## 2018-07-21 PROCEDURE — 97530 THERAPEUTIC ACTIVITIES: CPT

## 2018-07-21 PROCEDURE — 97535 SELF CARE MNGMENT TRAINING: CPT

## 2018-07-21 PROCEDURE — 1210000002 HC MED SURG R&B - NEUROSCIENCE

## 2018-07-21 PROCEDURE — 6360000002 HC RX W HCPCS: Performed by: NEUROLOGICAL SURGERY

## 2018-07-21 RX ORDER — PANTOPRAZOLE SODIUM 40 MG/1
40 TABLET, DELAYED RELEASE ORAL
Status: DISCONTINUED | OUTPATIENT
Start: 2018-07-22 | End: 2018-07-25 | Stop reason: HOSPADM

## 2018-07-21 RX ADMIN — HYDROCODONE BITARTRATE AND ACETAMINOPHEN 1 TABLET: 5; 325 TABLET ORAL at 08:36

## 2018-07-21 RX ADMIN — DOCUSATE SODIUM 100 MG: 100 CAPSULE, LIQUID FILLED ORAL at 08:37

## 2018-07-21 RX ADMIN — BACITRACIN: 500 OINTMENT TOPICAL at 08:37

## 2018-07-21 RX ADMIN — LEVETIRACETAM 500 MG: 100 INJECTION, SOLUTION INTRAVENOUS at 21:46

## 2018-07-21 RX ADMIN — HYDROCODONE BITARTRATE AND ACETAMINOPHEN 1 TABLET: 5; 325 TABLET ORAL at 21:46

## 2018-07-21 RX ADMIN — Medication 3.3 MG: at 05:34

## 2018-07-21 RX ADMIN — BACITRACIN: 500 OINTMENT TOPICAL at 15:22

## 2018-07-21 RX ADMIN — ACETAMINOPHEN 650 MG: 325 TABLET ORAL at 15:21

## 2018-07-21 RX ADMIN — LEVETIRACETAM 500 MG: 100 INJECTION, SOLUTION INTRAVENOUS at 08:37

## 2018-07-21 RX ADMIN — Medication 10 ML: at 08:37

## 2018-07-21 RX ADMIN — Medication 10 ML: at 21:48

## 2018-07-21 RX ADMIN — BACITRACIN: 500 OINTMENT TOPICAL at 21:46

## 2018-07-21 RX ADMIN — PANTOPRAZOLE SODIUM 40 MG: 40 INJECTION, POWDER, FOR SOLUTION INTRAVENOUS at 08:37

## 2018-07-21 ASSESSMENT — PAIN SCALES - GENERAL
PAINLEVEL_OUTOF10: 7
PAINLEVEL_OUTOF10: 5
PAINLEVEL_OUTOF10: 4
PAINLEVEL_OUTOF10: 3
PAINLEVEL_OUTOF10: 0
PAINLEVEL_OUTOF10: 7
PAINLEVEL_OUTOF10: 2
PAINLEVEL_OUTOF10: 2
PAINLEVEL_OUTOF10: 0
PAINLEVEL_OUTOF10: 0

## 2018-07-21 ASSESSMENT — PAIN DESCRIPTION - DESCRIPTORS
DESCRIPTORS: ACHING
DESCRIPTORS: THROBBING
DESCRIPTORS: THROBBING

## 2018-07-21 ASSESSMENT — PAIN DESCRIPTION - PAIN TYPE
TYPE: ACUTE PAIN

## 2018-07-21 ASSESSMENT — PAIN DESCRIPTION - ORIENTATION: ORIENTATION: ANTERIOR

## 2018-07-21 ASSESSMENT — PAIN DESCRIPTION - FREQUENCY
FREQUENCY: CONTINUOUS
FREQUENCY: CONTINUOUS

## 2018-07-21 ASSESSMENT — PAIN DESCRIPTION - LOCATION
LOCATION: HEAD

## 2018-07-21 NOTE — PROGRESS NOTES
0535 - Slight dizziness with ambulation to bathroom and back to bed. Patient is alert and oriented x4. Generalized discomfort noted, declines any pain medication. Patient was sweaty in bed, gown and linens changed and heat turned down. Orange juice given to patient. Bed alarm on.
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
## 2018-07-21 NOTE — PROGRESS NOTES
marijuana on admission     General diet  Repeat labs in AM  Strict I&O  Prophylaxis: SCDs, Protonix PO, IS, stool softeners, C&DB  Discharge disposition pending clinical course         SUBJECTIVE  Pt doing well. Adequate analgesia. he is tolerating a general diet, complains of throat soreness; to be expected post extubation. Pt tolerating getting up to commode. Pt is passing flatus and had a bowel movement this morning 7/21. Pt denies any nausea of vomiting. Extubated 7/19 evening, pulmonary status remains stable, patient is on room air. No seizure activity noted, IV Keppra continued. Wt Readings from Last 3 Encounters:   07/21/18 128 lb 11.2 oz (58.4 kg)     Temp Readings from Last 3 Encounters:   07/21/18 98.3 °F (36.8 °C) (Oral)     BP Readings from Last 3 Encounters:   07/21/18 (!) 161/92     Pulse Readings from Last 3 Encounters:   07/21/18 62       24 HR INTAKE/OUTPUT :     Intake/Output Summary (Last 24 hours) at 07/21/18 0752  Last data filed at 07/21/18 0531   Gross per 24 hour   Intake             1269 ml   Output              250 ml   Net             1019 ml   BM = 0    DIET GENERAL;    OBJECTIVE  CURRENT VITALS BP (!) 161/92   Pulse 62   Temp 98.3 °F (36.8 °C) (Oral)   Resp 16   Ht 5' 8\" (1.727 m)   Wt 128 lb 11.2 oz (58.4 kg)   SpO2 99%   BMI 19.57 kg/m²        GENERAL: alert, cooperative, healthy, no distress  NEURO: Awake, alert and oriented. PERRL. Conversing fluently and appropriately. Mild speech delay.  No evidence of seizure activity  LUNGS: clear to auscultation bilaterally- no wheezes, rales or rhonchi, normal air movement, no respiratory distress  HEART: normal rate, normal S1 and S2, no gallops, intact distal pulses and no carotid bruits  ABDOMEN: soft, non-tender, non-distended, normal bowel sounds, no masses or organomegaly  WOUNDS: laceration to scalp with no significant drainage, no significant erythema, staples are intact  EXTREMITY: no cyanosis, no clubbing and no calvarium, corresponding to a skull fracture which is better visualized on the prior CT of the head. Mild soft tissue    swelling and susceptibility artifacts are noted of the right parietal scalp from a prior laceration.           Impression        Unremarkable MRI of the brain. No evidence of acute intracranial traumatic abnormality.                   **This report has been created using voice recognition software. It may contain minor errors which are inherent in voice recognition technology. **       Final report electronically signed by Dr. Kranthi Durán on 7/19/2018 1:25 PM     Note compiled by Tara Teixeira CNP student in collaboration with Leif Young CNP. Electronically signed by Lenox Hodgkin, APRN - CNP on 7/21/2018 at 11:06 AM    Patient seen and examined independently by me early this AM. Above discussed and I agree with Sommer Puenet CNP. See my additional comments below for updated orders and plan. Labs, cultures, and radiographs where available were reviewed. I discussed patient concerns with the patient's nurse and instructions were given. Please see our orders for the updated patient care plan. - No recent seizure-like activity. EEG negative for seizure activity. Neurosurgical service following. No surgical intervention at this time. Continue seizure precautions. Acute kidney injury resolved. Pain control. DVT prophylaxis. Addiction service following. Discharge planning and hopefully home soon.     Electronically signed by Roderick Badillo MD on 7/21/2018 at 12:04 PM

## 2018-07-22 PROCEDURE — 99232 SBSQ HOSP IP/OBS MODERATE 35: CPT | Performed by: SURGERY

## 2018-07-22 PROCEDURE — 97110 THERAPEUTIC EXERCISES: CPT

## 2018-07-22 PROCEDURE — 6360000002 HC RX W HCPCS: Performed by: NEUROLOGICAL SURGERY

## 2018-07-22 PROCEDURE — 1210000002 HC MED SURG R&B - NEUROSCIENCE

## 2018-07-22 PROCEDURE — 6370000000 HC RX 637 (ALT 250 FOR IP): Performed by: SURGERY

## 2018-07-22 PROCEDURE — 97116 GAIT TRAINING THERAPY: CPT

## 2018-07-22 PROCEDURE — 6370000000 HC RX 637 (ALT 250 FOR IP): Performed by: NURSE PRACTITIONER

## 2018-07-22 PROCEDURE — 2580000003 HC RX 258: Performed by: NURSE PRACTITIONER

## 2018-07-22 PROCEDURE — 97530 THERAPEUTIC ACTIVITIES: CPT

## 2018-07-22 PROCEDURE — 2709999900 HC NON-CHARGEABLE SUPPLY

## 2018-07-22 PROCEDURE — APPSS60 APP SPLIT SHARED TIME 46-60 MINUTES: Performed by: NURSE PRACTITIONER

## 2018-07-22 PROCEDURE — 2580000003 HC RX 258: Performed by: NEUROLOGICAL SURGERY

## 2018-07-22 PROCEDURE — 99231 SBSQ HOSP IP/OBS SF/LOW 25: CPT | Performed by: NEUROLOGICAL SURGERY

## 2018-07-22 RX ORDER — LEVETIRACETAM 500 MG/1
500 TABLET ORAL 2 TIMES DAILY
Status: DISCONTINUED | OUTPATIENT
Start: 2018-07-22 | End: 2018-07-25 | Stop reason: HOSPADM

## 2018-07-22 RX ADMIN — LEVETIRACETAM 500 MG: 500 TABLET, FILM COATED ORAL at 12:34

## 2018-07-22 RX ADMIN — ACETAMINOPHEN 650 MG: 325 TABLET ORAL at 10:06

## 2018-07-22 RX ADMIN — BACITRACIN: 500 OINTMENT TOPICAL at 10:06

## 2018-07-22 RX ADMIN — BACITRACIN: 500 OINTMENT TOPICAL at 16:47

## 2018-07-22 RX ADMIN — LEVETIRACETAM 500 MG: 500 TABLET, FILM COATED ORAL at 21:45

## 2018-07-22 RX ADMIN — DOCUSATE SODIUM 100 MG: 100 CAPSULE, LIQUID FILLED ORAL at 21:12

## 2018-07-22 RX ADMIN — PANTOPRAZOLE SODIUM 40 MG: 40 TABLET, DELAYED RELEASE ORAL at 05:36

## 2018-07-22 RX ADMIN — BACITRACIN: 500 OINTMENT TOPICAL at 21:46

## 2018-07-22 RX ADMIN — Medication 10 ML: at 10:06

## 2018-07-22 ASSESSMENT — PAIN SCALES - GENERAL
PAINLEVEL_OUTOF10: 0
PAINLEVEL_OUTOF10: 3
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
## 2018-07-23 LAB — VRE CULTURE: NORMAL

## 2018-07-23 PROCEDURE — APPSS45 APP SPLIT SHARED TIME 31-45 MINUTES: Performed by: PHYSICIAN ASSISTANT

## 2018-07-23 PROCEDURE — 6370000000 HC RX 637 (ALT 250 FOR IP): Performed by: NURSE PRACTITIONER

## 2018-07-23 PROCEDURE — 6370000000 HC RX 637 (ALT 250 FOR IP): Performed by: SURGERY

## 2018-07-23 PROCEDURE — 97116 GAIT TRAINING THERAPY: CPT

## 2018-07-23 PROCEDURE — 99232 SBSQ HOSP IP/OBS MODERATE 35: CPT | Performed by: SURGERY

## 2018-07-23 PROCEDURE — 99223 1ST HOSP IP/OBS HIGH 75: CPT | Performed by: PHYSICAL MEDICINE & REHABILITATION

## 2018-07-23 PROCEDURE — 97110 THERAPEUTIC EXERCISES: CPT

## 2018-07-23 PROCEDURE — 6370000000 HC RX 637 (ALT 250 FOR IP): Performed by: PHYSICIAN ASSISTANT

## 2018-07-23 PROCEDURE — 97127 HC SP THER IVNTJ W/FOCUS COG FUNCJ: CPT

## 2018-07-23 PROCEDURE — 99231 SBSQ HOSP IP/OBS SF/LOW 25: CPT | Performed by: NEUROLOGICAL SURGERY

## 2018-07-23 PROCEDURE — 1210000002 HC MED SURG R&B - NEUROSCIENCE

## 2018-07-23 PROCEDURE — 2709999900 HC NON-CHARGEABLE SUPPLY

## 2018-07-23 PROCEDURE — 97530 THERAPEUTIC ACTIVITIES: CPT

## 2018-07-23 PROCEDURE — 97535 SELF CARE MNGMENT TRAINING: CPT

## 2018-07-23 RX ORDER — AMLODIPINE BESYLATE 5 MG/1
5 TABLET ORAL DAILY
Status: DISCONTINUED | OUTPATIENT
Start: 2018-07-23 | End: 2018-07-25 | Stop reason: HOSPADM

## 2018-07-23 RX ADMIN — PANTOPRAZOLE SODIUM 40 MG: 40 TABLET, DELAYED RELEASE ORAL at 09:45

## 2018-07-23 RX ADMIN — LEVETIRACETAM 500 MG: 500 TABLET, FILM COATED ORAL at 09:45

## 2018-07-23 RX ADMIN — BACITRACIN: 500 OINTMENT TOPICAL at 11:00

## 2018-07-23 RX ADMIN — LEVETIRACETAM 500 MG: 500 TABLET, FILM COATED ORAL at 22:58

## 2018-07-23 RX ADMIN — DOCUSATE SODIUM 100 MG: 100 CAPSULE, LIQUID FILLED ORAL at 09:45

## 2018-07-23 RX ADMIN — BACITRACIN: 500 OINTMENT TOPICAL at 22:58

## 2018-07-23 RX ADMIN — BACITRACIN: 500 OINTMENT TOPICAL at 16:12

## 2018-07-23 RX ADMIN — AMLODIPINE BESYLATE 5 MG: 5 TABLET ORAL at 16:12

## 2018-07-23 RX ADMIN — DOCUSATE SODIUM 100 MG: 100 CAPSULE, LIQUID FILLED ORAL at 22:58

## 2018-07-23 ASSESSMENT — PAIN SCALES - GENERAL: PAINLEVEL_OUTOF10: 0
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
## 2018-07-23 NOTE — PLAN OF CARE
Problem: Falls - Risk of:  Goal: Absence of physical injury  Absence of physical injury   Outcome: Ongoing  Pt. Worked with PT/OT today. Up in chair. Gait steady. Up with stand by assist.     Problem: Discharge Planning:  Goal: Discharged to appropriate level of care  Discharged to appropriate level of care   Outcome: Ongoing  Pt. Declined for inpatient rehab/tcu. Planning on discharge home or home with daughter. Problem: Pain:  Goal: Control of acute pain  Control of acute pain   Outcome: Ongoing  Patient able to use 0-10 pain scale. Denies pain at this time. Goal: Control of chronic pain  Control of chronic pain   Outcome: Ongoing  Patient able to use 0-10 pain scale. Denies pain at this time. Comments: Care plan reviewed with patient. Patient verbalizes understanding of the plan of care and contribute to goal setting.
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
## 2018-07-24 PROCEDURE — 97110 THERAPEUTIC EXERCISES: CPT

## 2018-07-24 PROCEDURE — 97116 GAIT TRAINING THERAPY: CPT

## 2018-07-24 PROCEDURE — 2709999900 HC NON-CHARGEABLE SUPPLY

## 2018-07-24 PROCEDURE — 99232 SBSQ HOSP IP/OBS MODERATE 35: CPT | Performed by: SURGERY

## 2018-07-24 PROCEDURE — 6370000000 HC RX 637 (ALT 250 FOR IP): Performed by: SURGERY

## 2018-07-24 PROCEDURE — 6370000000 HC RX 637 (ALT 250 FOR IP): Performed by: NURSE PRACTITIONER

## 2018-07-24 PROCEDURE — 97530 THERAPEUTIC ACTIVITIES: CPT

## 2018-07-24 PROCEDURE — APPSS60 APP SPLIT SHARED TIME 46-60 MINUTES: Performed by: NURSE PRACTITIONER

## 2018-07-24 PROCEDURE — 6370000000 HC RX 637 (ALT 250 FOR IP): Performed by: PHYSICIAN ASSISTANT

## 2018-07-24 PROCEDURE — 2060000000 HC ICU INTERMEDIATE R&B

## 2018-07-24 PROCEDURE — 99231 SBSQ HOSP IP/OBS SF/LOW 25: CPT | Performed by: NEUROLOGICAL SURGERY

## 2018-07-24 PROCEDURE — 97535 SELF CARE MNGMENT TRAINING: CPT

## 2018-07-24 PROCEDURE — 97127 HC SP THER IVNTJ W/FOCUS COG FUNCJ: CPT

## 2018-07-24 RX ADMIN — DOCUSATE SODIUM 100 MG: 100 CAPSULE, LIQUID FILLED ORAL at 22:26

## 2018-07-24 RX ADMIN — LEVETIRACETAM 500 MG: 500 TABLET, FILM COATED ORAL at 22:26

## 2018-07-24 RX ADMIN — BACITRACIN: 500 OINTMENT TOPICAL at 22:26

## 2018-07-24 RX ADMIN — DOCUSATE SODIUM 100 MG: 100 CAPSULE, LIQUID FILLED ORAL at 10:39

## 2018-07-24 RX ADMIN — BACITRACIN: 500 OINTMENT TOPICAL at 10:39

## 2018-07-24 RX ADMIN — PANTOPRAZOLE SODIUM 40 MG: 40 TABLET, DELAYED RELEASE ORAL at 10:39

## 2018-07-24 RX ADMIN — LEVETIRACETAM 500 MG: 500 TABLET, FILM COATED ORAL at 10:39

## 2018-07-24 RX ADMIN — AMLODIPINE BESYLATE 5 MG: 5 TABLET ORAL at 10:39

## 2018-07-24 ASSESSMENT — PAIN SCALES - GENERAL
PAINLEVEL_OUTOF10: 0

## 2018-07-24 NOTE — PLAN OF CARE
Problem: Nutrition  Goal: Optimal nutrition therapy  Outcome: Met This Shift      Problem: Risk for Impaired Skin Integrity  Goal: Tissue integrity - skin and mucous membranes  Structural intactness and normal physiological function of skin and  mucous membranes. Outcome: Met This Shift      Problem: Falls - Risk of:  Goal: Will remain free from falls  Will remain free from falls   Outcome: Met This Shift    Goal: Absence of physical injury  Absence of physical injury   Outcome: Met This Shift      Problem: Discharge Planning:  Goal: Discharged to appropriate level of care  Discharged to appropriate level of care   Outcome: Ongoing  Possible D/C home with daughter later today    Problem: Airway Clearance - Ineffective:  Goal: Ability to maintain a clear airway will improve  Ability to maintain a clear airway will improve   Outcome: Met This Shift      Problem: Nutrition Deficit:  Goal: Ability to achieve adequate nutritional intake will improve  Ability to achieve adequate nutritional intake will improve   Outcome: Met This Shift      Problem: Pain:  Goal: Pain level will decrease  Pain level will decrease    Outcome: Met This Shift    Goal: Control of acute pain  Control of acute pain   Outcome: Met This Shift      Problem: Pain:  Goal: Control of acute pain  Control of acute pain   Outcome: Met This Shift    Goal: Control of chronic pain  Control of chronic pain   Outcome: Met This Shift    Goal: Pain level will decrease  Pain level will decrease    Outcome: Met This Shift      Problem: DISCHARGE BARRIERS  Goal: Patient's continuum of care needs are met  Outcome: Met This Shift      Comments: Care plan reviewed with patient. Verbalized understanding plan of care and contribute to goal setting.
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
## 2018-07-24 NOTE — PROGRESS NOTES
[address] [facility] NEUROSCIENCES 4A    TIME   SLP Individual Minutes  Time In: 8661  Time Out: 8723  Minutes: 23  Timed Code Treatment Minutes: 23 Minutes       []Daily Note  [x]Progress Note  []Discharge Note    Date: 2018  Patient Name: Gerardo Monroe. MRN: [id number]    : 1970  (47 y.o.)  Gender: male   Primary Provider: Jen Last MD  Admitting Diagnosis:  Blunt head trauma  Secondary Diagnosis: cognitive impairment  Precautions: n/a  Swallowing Status/Diet: general  Swallowing Strategies: n/a  DATE of last MBS:  n/a  Pain:  When asked \"Do you have pain? \"  Patient responded, \"Just my head. \"  When ST asked, \"Do you have a headache? \"  Patient responded, \"It's just my head. \"     Subjective: Pt seen up in chair. Alert and cooperative. Upon entering room patient seen writing paragraphs on meal ticket. Patient reports, \"I am just writing my thoughts down. I feel some hesitancy. \"  RN Salvador Madrigal with request for ST to treat/evaluate patient to determine safety of discharge recommendations. SHORT TERM GOAL #1:  Goal 1: Pt will complete delayed recall and orientation tasks with 70% accuracy to improve retention of new information GOAL MET. NEW GOAL 1:  Patient will complete functional recall, short term memory tasks and working memory tasks with 80% accuracy provided min cues to improve recall of daily and medical information. INTERVENTIONS:Pt oriented indep . However, When asked \"what floor are you on?\"  Patient responded, \"I don't know that one. I know by looking outside I am up in the air. \"      Patient indep recalled he has been admitted since Friday. Patient presented with 3 locations, cued to recall throughout session. Immediate recall:  3/3 indep  10 minute delay:  3/3 indep     SHORT TERM GOAL #2:  Goal 2: Pt will compelte high level thought organization tasks with 70% accuracy to improve mental flexibility GOAL NOT MET.   CONTINUE GOAL.   INTERVENTIONS:  Patient presented with functional problem solving situation within the community setting cued to generate safe solution x1:  2/2 indep  Patient presented with functional problem solving situations cued to generate 3 reasons to support causation of problems:  Trial 1:  3/3 indep, Trial 2:  2/3 indep, 1/3 unable to generate despite max cues d/t impaired mental flexibility        However, Patient with confused language throughout session negatively impacting conversational discourse     SHORT TERM GOAL #3:  Goal 3: Pt will maintain appropriate attention and alertness for complex tasks with min assist to improve function in home and work settings. GOAL NOT MET. CONTINUE GOAL. INTERVENTIONS:Televion on as external distraction, multiple nursing/doctors within [address]ined[address] way and pen clicks from ST; patient required consistent min cues to continue theraputic tasks. Delayed processing speed noted. Task 1:  Counting 0-64 by 2's, patient indep counted 0-62 without difficulty, unable to recall appropriate stopping point \"64\"   Patient reported, \"I tried to block out all the distractions. \"        ASSESSMENT:   Patient has met 1/3 STG's with overall cognitive impairments since acute cognitive evaluation. However, ST with remaining safety concerns with patient potentially discharging to the home setting. Suspect at least mild-moderate cognitive impairment. As evidence by tangential speech, poor mental flexibility and impaired thought organization negatively impacting conversational discourse in an unstrucutred setting; a skill which would be necessary to successfully  return to community setting and independent discharge to home setting. Recommend 24/hour supervision and outpatient therapy services upon discharge.      Assessment: [x]Progressing towards goals          []Not Progressing towards goals       Patient Tolerance of Treatment:   [x]Tolerated well []Tolerated fair []Required rest breaks []Fatigued  Plan for Next Session:  Delayed recall task  Education:  Learner:  [x]Patient          []Significant Other          []Other  Education provided regarding:  [x]Goals and POC   []Diet and swallowing precautions    []Home Exercise Program  []Progress and/or discharge information  Method of Education:  [x]Discussion          []Demonstration          []Handout          []Other  Evaluation of Education:   [x]Verbalized understanding   []Demonstrates without assistance  []Demonstrates with assistance  []Needs further instruction     []No evidence of learning                  [x]No family present      Plan: [x]Continue with current plan of care    []Modify current plan of care as follows:    []Discharge patient    Discharge Location:    Services/Supervision Recommended:     [x]Patient continues to require treatment by a licensed therapist to address functional deficits as outlined in the established plan of care.     Braeden Lopez M.S. [address]
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
## 2018-07-25 VITALS
SYSTOLIC BLOOD PRESSURE: 123 MMHG | HEART RATE: 60 BPM | BODY MASS INDEX: 21.75 KG/M2 | OXYGEN SATURATION: 97 % | HEIGHT: 68 IN | TEMPERATURE: 98.3 F | WEIGHT: 143.5 LBS | DIASTOLIC BLOOD PRESSURE: 75 MMHG | RESPIRATION RATE: 16 BRPM

## 2018-07-25 PROCEDURE — APPNB60 APP NON BILLABLE TIME 46-60 MINS: Performed by: PHYSICIAN ASSISTANT

## 2018-07-25 PROCEDURE — 97530 THERAPEUTIC ACTIVITIES: CPT

## 2018-07-25 PROCEDURE — 6370000000 HC RX 637 (ALT 250 FOR IP): Performed by: PHYSICIAN ASSISTANT

## 2018-07-25 PROCEDURE — 97116 GAIT TRAINING THERAPY: CPT

## 2018-07-25 PROCEDURE — 6370000000 HC RX 637 (ALT 250 FOR IP): Performed by: SURGERY

## 2018-07-25 PROCEDURE — 6370000000 HC RX 637 (ALT 250 FOR IP): Performed by: NURSE PRACTITIONER

## 2018-07-25 PROCEDURE — 97535 SELF CARE MNGMENT TRAINING: CPT

## 2018-07-25 PROCEDURE — 97110 THERAPEUTIC EXERCISES: CPT

## 2018-07-25 PROCEDURE — 6370000000 HC RX 637 (ALT 250 FOR IP): Performed by: INTERNAL MEDICINE

## 2018-07-25 PROCEDURE — APPSS60 APP SPLIT SHARED TIME 46-60 MINUTES: Performed by: PHYSICIAN ASSISTANT

## 2018-07-25 PROCEDURE — 99239 HOSP IP/OBS DSCHRG MGMT >30: CPT | Performed by: SURGERY

## 2018-07-25 RX ORDER — HYDROCODONE BITARTRATE AND ACETAMINOPHEN 5; 325 MG/1; MG/1
1 TABLET ORAL EVERY 6 HOURS PRN
Qty: 28 TABLET | Refills: 0 | Status: SHIPPED | OUTPATIENT
Start: 2018-07-25 | End: 2018-08-01

## 2018-07-25 RX ORDER — ONDANSETRON 4 MG/1
4 TABLET, FILM COATED ORAL EVERY 6 HOURS PRN
Status: DISCONTINUED | OUTPATIENT
Start: 2018-07-25 | End: 2018-07-25 | Stop reason: HOSPADM

## 2018-07-25 RX ORDER — LEVETIRACETAM 500 MG/1
500 TABLET ORAL 2 TIMES DAILY
Qty: 60 TABLET | Refills: 3 | Status: SHIPPED | OUTPATIENT
Start: 2018-07-25

## 2018-07-25 RX ORDER — AMLODIPINE BESYLATE 5 MG/1
5 TABLET ORAL DAILY
Qty: 30 TABLET | Refills: 3 | Status: SHIPPED | OUTPATIENT
Start: 2018-07-26

## 2018-07-25 RX ORDER — PSEUDOEPHEDRINE HCL 30 MG
100 TABLET ORAL 2 TIMES DAILY
COMMUNITY
Start: 2018-07-25

## 2018-07-25 RX ORDER — ONDANSETRON 4 MG/1
4 TABLET, ORALLY DISINTEGRATING ORAL EVERY 8 HOURS PRN
Qty: 15 TABLET | Refills: 0 | Status: SHIPPED | OUTPATIENT
Start: 2018-07-25

## 2018-07-25 RX ORDER — GINSENG 100 MG
CAPSULE ORAL
Refills: 3 | COMMUNITY
Start: 2018-07-25 | End: 2018-08-04

## 2018-07-25 RX ADMIN — BACITRACIN: 500 OINTMENT TOPICAL at 09:15

## 2018-07-25 RX ADMIN — PANTOPRAZOLE SODIUM 40 MG: 40 TABLET, DELAYED RELEASE ORAL at 06:20

## 2018-07-25 RX ADMIN — ACETAMINOPHEN 650 MG: 325 TABLET ORAL at 05:10

## 2018-07-25 RX ADMIN — HYDROCODONE BITARTRATE AND ACETAMINOPHEN 1 TABLET: 5; 325 TABLET ORAL at 09:15

## 2018-07-25 RX ADMIN — AMLODIPINE BESYLATE 5 MG: 5 TABLET ORAL at 09:15

## 2018-07-25 RX ADMIN — LEVETIRACETAM 500 MG: 500 TABLET, FILM COATED ORAL at 09:14

## 2018-07-25 ASSESSMENT — PAIN SCALES - GENERAL
PAINLEVEL_OUTOF10: 4
PAINLEVEL_OUTOF10: 3
PAINLEVEL_OUTOF10: 3
PAINLEVEL_OUTOF10: 0
PAINLEVEL_OUTOF10: 4
PAINLEVEL_OUTOF10: 0
PAINLEVEL_OUTOF10: 1
PAINLEVEL_OUTOF10: 4

## 2018-07-25 ASSESSMENT — PAIN DESCRIPTION - PAIN TYPE
TYPE: ACUTE PAIN

## 2018-07-25 ASSESSMENT — PAIN DESCRIPTION - LOCATION
LOCATION: HEAD

## 2018-07-25 ASSESSMENT — PAIN DESCRIPTION - FREQUENCY
FREQUENCY: INTERMITTENT
FREQUENCY: INTERMITTENT

## 2018-07-25 ASSESSMENT — PAIN DESCRIPTION - DESCRIPTORS
DESCRIPTORS: THROBBING
DESCRIPTORS: THROBBING

## 2018-07-25 ASSESSMENT — PAIN DESCRIPTION - ORIENTATION
ORIENTATION: ANTERIOR
ORIENTATION: ANTERIOR

## 2018-07-25 NOTE — PROGRESS NOTES
Patient ready for discharge. Discharge instructions explained and reviewed in detail with patient and daughter at the bedside. Follow up appointments reviewed with patient and daughter. Patient and daughter verbalized understanding regarding discharge instructions. No further questions voiced at this time. AVS signed and placed copy on chart. Meds to bed completed per [facility]. Staples in scalp intact well approximated and without redness or swelling noted at site. Patient verbalized understanding that the staples will come out durring his follow up appointment next week. Discharge time 1700.

## 2018-07-25 NOTE — PROGRESS NOTES
Nutrition Assessment    Type and Reason for Visit: Reassess    Nutrition Recommendations: Consider a multivitamin. Continue current diet. Malnutrition Assessment:  · Malnutrition Status: No malnutrition    Nutrition Diagnosis:   · Problem: Increased energy expenditure  · Etiology: related to Acute injury/trauma     Signs and symptoms:  as evidenced by  (head laceration)    Nutrition Assessment:  · Subjective Assessment: Pt seen, reports he is hoping to go home later today. Reports tolerating current diet, appetite is doing pretty good, has been eating most of his meals. Encouraged continued good oral intake and protein sources. States usual weight~ 140#. Pt states he was eating well at home and weight has been stable. · Wound Type:  (scalp laceration)  · Current Nutrition Therapies:  · Oral Diet Orders: General   · Oral Diet intake: % (most recent meals)  · Oral Nutrition Supplement (ONS) Orders:  (pt. declines)  · Anthropometric Measures:  · Ht: 5' 8\" (172.7 cm)   · Current Body Wt: 143 lb 8 oz (65.1 kg) (7/25/18)  · Admission Body Wt: 128 lb (58.1 kg) (7/19 with no edema)  · Usual Body Wt:  ( 140# per pt. No previous EMR weight records.)  · Ideal Body Wt: 154 lb (69.9 kg),   · BMI Classification: BMI 18.5 - 24.9 Normal Weight  · Comparative Standards (Estimated Nutrition Needs):  · Estimated Daily Total Kcal: 2100(30/kgm IBW of 70kgm))  · Estimated Daily Protein (g):  (1.5-2/kgm actual wt. of 58.5kgm)    Estimated Intake vs Estimated Needs: Intake Improving    Nutrition Risk Level: Moderate    Nutrition Interventions:   Continue current diet  Continued Inpatient Monitoring, Education Not Indicated    Nutrition Evaluation:   · Evaluation: Progressing toward goals   · Goals: Pt. to consume greater than 75% of meals during LOS    · Monitoring: Meal Intake, Diet Tolerance, Skin Integrity, Mental Status/Confusion, Weight, Pertinent Labs    See Adult Nutrition Doc Flowsheet for more detail. Electronically signed by Florencio Hernandez RD, MK on 7/25/18 at 2:42 PM    Contact Number: [phone]
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
## 2018-07-25 NOTE — PLAN OF CARE
Problem: Nutrition  Goal: Optimal nutrition therapy  Outcome: Ongoing  Nutrition Problem: Increased energy expenditure  Intervention: Food and/or Nutrient Delivery: Continue current diet  Nutritional Goals: Pt. to consume greater than 75% of meals during LOS

## 2018-07-25 NOTE — PROGRESS NOTES
PT/OT/SLP. Wt Readings from Last 3 Encounters:   07/25/18 143 lb 8 oz (65.1 kg)     Temp Readings from Last 3 Encounters:   07/25/18 98.3 °F (36.8 °C) (Oral)     BP Readings from Last 3 Encounters:   07/25/18 123/75     Pulse Readings from Last 3 Encounters:   07/25/18 60       24 HR INTAKE/OUTPUT :     Intake/Output Summary (Last 24 hours) at 07/25/18 1648  Last data filed at 07/25/18 0506   Gross per 24 hour   Intake                0 ml   Output                0 ml   Net                0 ml   BM = 0    DIET GENERAL;    OBJECTIVE  CURRENT VITALS /75   Pulse 60   Temp 98.3 °F (36.8 °C) (Oral)   Resp 16   Ht 5' 8\" (1.727 m)   Wt 143 lb 8 oz (65.1 kg)   SpO2 97%   BMI 21.82 kg/m²        GENERAL: alert, cooperative, no distress  NEURO: Awake, alert and oriented. PERRL. Appropriate to conversation, but has a delayed response (improving). No evidence of seizure activity, no focal deficits  LUNGS: clear to auscultation bilaterally- no wheezes, rales or rhonchi, normal air movement, no respiratory distress  HEART: normal rate, normal S1 and S2, intact distal pulses   ABDOMEN: soft, non-tender, non-distended, normal bowel sounds, no masses or organomegaly  WOUNDS: laceration to scalp with no significant drainage, staples are intact, no dehiscence or erythema   EXTREMITY: no cyanosis, no clubbing and no edema      LABS  CBC :   No results for input(s): WBC, HGB, HCT, MCV, PLT in the last 72 hours. BMP:   No results for input(s): NA, K, CL, CO2, BUN, CREATININE in the last 72 hours. COAGS:   No results for input(s): APTT, PROT, INR in the last 72 hours. Pancreas/HFP:  No results for input(s): LIPASE, AMYLASE in the last 72 hours. No results for input(s): AST, ALT, BILIDIR, BILITOT, ALKPHOS in the last 72 hours.        RADIOLOGY:  No new scans       Electronically signed by Nora Best PA-C on 7/25/2018 at 4:55 PM  Pt cleared by all consultants for discharge  Pt has home arrangements made  Stable for discharge  Home today

## 2018-07-26 ENCOUNTER — HOSPITAL ENCOUNTER (OUTPATIENT)
Dept: PHYSICAL THERAPY | Age: 48
Setting detail: THERAPIES SERIES
Discharge: HOME | End: 2018-07-26
Payer: MEDICAID

## 2018-07-26 NOTE — PROGRESS NOTES
CLINICAL PHARMACY NOTE: MEDS TO [address] Select Patient?: No  Total # of Prescriptions Filled: 4   The following medications were delivered to the patient:  · Hydrocodone-apap 5-325mg, amlodipine 5mg, ondansetron odt 4mg, levetiracetam 500mg  Total # of Interventions Completed: 1  Time Spent (min): 30    Additional Documentation:

## 2018-07-26 NOTE — PROGRESS NOTES
CLINICAL PHARMACY NOTE: MEDS TO [address] Select Patient?: No  Total # of Prescriptions Filled: 4   The following medications were delivered to the patient:  · Hydrocodone/apap 5-325mg, amlodipine 5mg, ondansetron 4mg odt, levetiracetam 500mg  Total # of Interventions Completed: 1  Time Spent (min): 30    Additional Documentation:

## 2018-07-29 NOTE — DISCHARGE SUMMARY
patient to be discharged to a friends house. Patient remained stable and was discharged to a friend's home 7/25/18 for 24 hour care. Patient will also receive PT/OT/SLP through home health. He is scheduled for follow up appointments with Neurosurgery and primary care within one month and one week respectively. Discharge Medications:   Page Pablito. Home Medication Instructions CKR:[id number]    Printed on:07/29/18 6031   Medication Information                      amLODIPine (NORVASC) 5 MG tablet  Take 1 tablet by mouth daily             bacitracin 500 UNIT/GM ointment  Apply topically 2 times daily. docusate sodium (COLACE, DULCOLAX) 100 MG CAPS  Take 100 mg by mouth 2 times daily             HYDROcodone-acetaminophen (NORCO) 5-325 MG per tablet  Take 1 tablet by mouth every 6 hours as needed for Pain for up to 7 days. Theadore Kera levETIRAcetam (KEPPRA) 500 MG tablet  Take 1 tablet by mouth 2 times daily             ondansetron (ZOFRAN ODT) 4 MG disintegrating tablet  Take 1 tablet by mouth every 8 hours as needed for Nausea or Vomiting                 Patient Instructions:    Discharge lab work: Activity: activity as tolerated  Diet:      Code Status: Prior    Follow-up visits:   Chris Quan MD  [address] [place] [address]. Maliha Angel      Follow up in 1 month    Dr. Mateo Bauer  [id number], [address] [facility]  [phone]  On 7/31/2018  @ 11:00    [address]  [phone]  On 7/26/2018  @ 1:30 and 3:00       Procedures: Intubated and Extubated 7/19/18    Consults:   rehabilitation medicine, Neurosurgery    Examination:  Vitals:  Vitals:    07/24/18 2000 07/24/18 2323 07/25/18 0508 07/25/18 0900   BP: 115/68 125/71 (!) 112/55 123/75   Pulse: 60 60 62 60   Resp: 16  16 16   Temp: 98.9 °F (37.2 °C)  98.5 °F (36.9 °C) 98.3 °F (36.8 °C)   TempSrc: Oral  Oral Oral   SpO2: 99%  97% 97%   Weight:   143 lb 8 oz (65.1 kg)    Height:         Weight: Weight: 143 lb 8 oz (65.1 kg)     24 hour intake/output:No intake or output data in the 24 hours ending 07/29/18 1319    GENERAL: alert, cooperative, no distress  NEURO: Awake, alert and oriented. PERRL. Appropriate to conversation, but has a delayed response (improving). No evidence of seizure activity, no focal deficits  LUNGS: clear to auscultation bilaterally- no wheezes, rales or rhonchi, normal air movement, no respiratory distress  HEART: normal rate, normal S1 and S2, intact distal pulses   ABDOMEN: soft, non-tender, non-distended, normal bowel sounds, no masses or organomegaly  WOUNDS: laceration to scalp with no significant drainage, staples are intact, no dehiscence or erythema   EXTREMITY: no cyanosis, no clubbing and no edema    Significant Diagnostics:   Radiology: No results found. Labs: No results found for this or any previous visit (from the past 72 hour(s)).     Discharge condition: stable  Disposition: Home with [address] [place] [address]  Time spent on discharge: >35 minutes       Electronically signed by Sho Gomes PA-C on 7/29/2018 at 1:51 PM

## 2020-10-27 NOTE — CARE COORDINATION
7/25/18, 10:00 AM    Discharge plan discussed by  and . Discharge plan reviewed with patient/ family. Patient/ family verbalize understanding of discharge plan and are in agreement with plan. Understanding was demonstrated using the teach back method. Trent Aldana will be discharged today. Spoke with his daughter Deepika Saini [phone] and she made SW aware she will be picking him up today between 2 and 5pm.  She will be taking him to his friend Vern's home for a short time while she gets and apartment for her and her father to live in. Outpatient therapy orders will be sent with him. Per verbal order from Dr. Coyne Patches to place referral for Dermatology.

## 2023-11-17 NOTE — PLAN OF CARE
Problem: Nutrition  Goal: Optimal nutrition therapy  Outcome: Ongoing  Nutrition Problem: Increased energy expenditure  Intervention: Food and/or Nutrient Delivery: Continue current diet  Nutritional Goals: Pt. to consume greater than 75% of meals during LOS Yes